# Patient Record
Sex: MALE | Race: WHITE | NOT HISPANIC OR LATINO | URBAN - METROPOLITAN AREA
[De-identification: names, ages, dates, MRNs, and addresses within clinical notes are randomized per-mention and may not be internally consistent; named-entity substitution may affect disease eponyms.]

---

## 2019-01-01 ENCOUNTER — NURSE TRIAGE (OUTPATIENT)
Dept: NURSING | Facility: CLINIC | Age: 84
End: 2019-01-01

## 2019-10-13 NOTE — TELEPHONE ENCOUNTER
[phone]  FNA triage call :   Presenting problem : [facility] Nurse Angela MENDOZA (LONG TERM ) called without Pt.  .   FNA reviewed one note clinic info on [facility] . Gave Angela the phone number for [facility] clinic and advised to call th page  @ if page on-call or if message needed to sent to [facility] .   Caller verbalizes understanding and denies further questions and will call back if further symptoms to triage or questions  . Comfort Carty RN  - [place] Nurse Advisor